# Patient Record
Sex: MALE | Race: WHITE | NOT HISPANIC OR LATINO | Employment: STUDENT | URBAN - METROPOLITAN AREA
[De-identification: names, ages, dates, MRNs, and addresses within clinical notes are randomized per-mention and may not be internally consistent; named-entity substitution may affect disease eponyms.]

---
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
## 2020-03-01 ENCOUNTER — HEALTH MAINTENANCE LETTER (OUTPATIENT)
Age: 20
End: 2020-03-01

## 2020-12-14 ENCOUNTER — HEALTH MAINTENANCE LETTER (OUTPATIENT)
Age: 20
End: 2020-12-14

## 2021-03-04 ENCOUNTER — TRANSFERRED RECORDS (OUTPATIENT)
Dept: HEALTH INFORMATION MANAGEMENT | Facility: CLINIC | Age: 21
End: 2021-03-04

## 2021-03-04 ENCOUNTER — OFFICE VISIT (OUTPATIENT)
Dept: ORTHOPEDICS | Facility: CLINIC | Age: 21
End: 2021-03-04
Payer: COMMERCIAL

## 2021-03-04 ENCOUNTER — ANCILLARY PROCEDURE (OUTPATIENT)
Dept: GENERAL RADIOLOGY | Facility: CLINIC | Age: 21
End: 2021-03-04
Attending: PREVENTIVE MEDICINE
Payer: COMMERCIAL

## 2021-03-04 DIAGNOSIS — S53.104S: ICD-10-CM

## 2021-03-04 DIAGNOSIS — S53.104S: Primary | ICD-10-CM

## 2021-03-04 PROCEDURE — 99213 OFFICE O/P EST LOW 20 MIN: CPT | Performed by: PREVENTIVE MEDICINE

## 2021-03-04 PROCEDURE — 73080 X-RAY EXAM OF ELBOW: CPT | Mod: RT | Performed by: RADIOLOGY

## 2021-03-04 ASSESSMENT — PAIN SCALES - GENERAL: PAINLEVEL: MILD PAIN (2)

## 2021-03-04 NOTE — LETTER
3/4/2021         RE: Mckinley Graham  [address] [facility] [address]        Dear Colleague,    Thank you for referring your patient, Mckinley Graham, to the [facility] [place]. Please see a copy of my visit note below.    HISTORY OF PRESENT ILLNESS  Mr. Graham is a pleasant 20 year old year old male who presents to clinic today with right elbow pain since 9/2018 injury/Dislocation  Has had treatments on his elbow at [facility]  Had reduction of elbow under anesthesia  Mckinley explains that since his elbow surgery after fracture/dislocation in 2018 he has continued to have some limitations in his motion and discomfort when he tries to lift weights and do activities at the gym  'he states that he doesn't feel 100% better' yet  He has no reports of numbness or tingling at this time in his arm or hand  Location: right elbow  Quality:  achy pain    Severity:5/10 at worst    Duration: since his injury in 2018 which occurred when he fell through a window from a height that injured his elbow and caused a fracture and dislocation that was reduced and repaired his extensor tendon, he also sustained a neck injury and a concussion  Timing: occurs intermittently in elbow  Context: occurs while exercising and lifting  Modifying factors:  resting and non-use makes it better, movement and use makes it worse  Associated signs & symptoms: occasional swelling    MEDICAL HISTORY  Patient Active Problem List   Diagnosis   (none) - all problems resolved or deleted       Current Outpatient Medications   Medication Sig Dispense Refill     Methylphenidate HCl (CONCERTA PO) Take 72 mg by mouth daily       diclofenac (VOLTAREN) 75 MG EC tablet Take 1 tablet (75 mg) by mouth 2 times daily as needed (Patient not taking: Reported on 3/4/2021) 40 tablet 1     tiZANidine (ZANAFLEX) 4 MG tablet Take 1-2 tablets (4-8 mg) by mouth nightly as needed (Patient not taking: Reported on 3/4/2021) 30  tablet 1       Allergies   Allergen Reactions     Cats      Pollen Extract        Family History   Problem Relation Age of Onset     Diabetes Other      Arthritis Other      Cancer Other      Psychotic Disorder Other      Social History     Socioeconomic History     Marital status: Single     Spouse name: Not on file     Number of children: Not on file     Years of education: Not on file     Highest education level: Not on file   Occupational History     Not on file   Social Needs     Financial resource strain: Not on file     Food insecurity     Worry: Not on file     Inability: Not on file     Transportation needs     Medical: Not on file     Non-medical: Not on file   Tobacco Use     Smoking status: Never Smoker     Smokeless tobacco: Never Used   Substance and Sexual Activity     Alcohol use: No     Drug use: No     Sexual activity: Never   Lifestyle     Physical activity     Days per week: Not on file     Minutes per session: Not on file     Stress: Not on file   Relationships     Social connections     Talks on phone: Not on file     Gets together: Not on file     Attends Zoroastrianism service: Not on file     Active member of club or organization: Not on file     Attends meetings of clubs or organizations: Not on file     Relationship status: Not on file     Intimate partner violence     Fear of current or ex partner: Not on file     Emotionally abused: Not on file     Physically abused: Not on file     Forced sexual activity: Not on file   Other Topics Concern     Not on file   Social History Narrative     Not on file       Additional medical/Social/Surgical histories reviewed in [facility] and updated as appropriate.     REVIEW OF SYSTEMS (3/4/2021)  10 point ROS of systems including Constitutional, Eyes, Respiratory, Cardiovascular, Gastroenterology, Genitourinary, Integumentary, Musculoskeletal, Psychiatric, Allergic/Immunologic were all negative except for pertinent positives noted in my HPI.     PHYSICAL  EXAM  VSS  General  - normal appearance, in no obvious distress  HEENT  - conjunctivae not injected, moist mucous membranes, normocephalic/atraumatic head, ears normal appearance, no lesions, mouth normal appearance, no scars, normal dentition and teeth present  CV  - normal radial pulse  Pulm  - normal respiratory pattern, non-labored  Musculoskeletal - right elbow  - inspection: normal joint alignment, no obvious deformity, mild soft tissue swelling laterally  - palpation: tender at the origin of the common extensor tendon  - ROM:  150 deg flexion with some discomfort- some limitation   Lacks 5 deg extension, with some discomfort   90 deg pronation    90 deg supination  - strength: 5/5 wrist extension with elbow flexed, 4/5 with elbow extended, painful resisted extension of long finger with elbow flexed, worse with extension, 5/5  strength  - special tests:  (-) varus  (-) valgus  (-) Tinel's  Neuro  - no sensory or motor deficit, grossly normal coordination, normal muscle tone  Skin  - no ecchymosis, erythema, warmth, or induration, no obvious rash  Psych  - interactive, appropriate, normal mood and affect  Neck: has negative spurlings today, full ROM of neck , with some discomfort with full extension  ASSESSMENT & PLAN  19 yo male with history of neck injury, strain, stable, and right elbow pain s/p elbow dislocation with reduction and surgical repair of tendon, not resolved  We reviewed his notes from previous treatments and due to the ongoing pain and discomfort, I recommended an MRI of the elbow for further evaluation at this time  Reviewed his elbow xray which showed: [place] along the radial head, medial humeral  epicondyles, new since comparison, may be due to posttraumatic change.  Otherwise, no substantial degenerative change.  He will followup with me after MRI  Recommended voltaren gel PRN and lidocaine patches PRN  And continue HEP for strengthening and flexibility    Appropriate PPE was utilized  for prevention of spread of Covid-19.  Grant Hdz MD, CAQSM        Again, thank you for allowing me to participate in the care of your patient.        Sincerely,        Grant Hdz MD

## 2021-03-04 NOTE — PATIENT INSTRUCTIONS
Thanks for coming today.  Ortho/Sports Medicine Clinic  [address] [place] [address]    To schedule future appointments in Ortho Clinic, you may call [phone].    To schedule ordered imaging by your provider:   Call Central Imaging [phone]    To schedule an injection ordered by your provider:  Call Central Imaging Injection scheduling line: [phone]  [email or web address] available online at:  [email or web address]    Please call if any further questions or concerns ([phone]).  Clinic hours 8 am to 5 pm.    Return to clinic (call) if symptoms worsen or fail to improve.

## 2021-03-04 NOTE — PROGRESS NOTES
HISTORY OF PRESENT ILLNESS  Mr. Graham is a pleasant 20 year old year old male who presents to clinic today with right elbow pain since 9/2018 injury/Dislocation  Has had treatments on his elbow at [facility]  Had reduction of elbow under anesthesia  Mckinley explains that since his elbow surgery after fracture/dislocation in 2018 he has continued to have some limitations in his motion and discomfort when he tries to lift weights and do activities at the gym  'he states that he doesn't feel 100% better' yet  He has no reports of numbness or tingling at this time in his arm or hand  Location: right elbow  Quality:  achy pain    Severity:5/10 at worst    Duration: since his injury in 2018 which occurred when he fell through a window from a height that injured his elbow and caused a fracture and dislocation that was reduced and repaired his extensor tendon, he also sustained a neck injury and a concussion  Timing: occurs intermittently in elbow  Context: occurs while exercising and lifting  Modifying factors:  resting and non-use makes it better, movement and use makes it worse  Associated signs & symptoms: occasional swelling    MEDICAL HISTORY  Patient Active Problem List   Diagnosis   (none) - all problems resolved or deleted       Current Outpatient Medications   Medication Sig Dispense Refill     Methylphenidate HCl (CONCERTA PO) Take 72 mg by mouth daily       diclofenac (VOLTAREN) 75 MG EC tablet Take 1 tablet (75 mg) by mouth 2 times daily as needed (Patient not taking: Reported on 3/4/2021) 40 tablet 1     tiZANidine (ZANAFLEX) 4 MG tablet Take 1-2 tablets (4-8 mg) by mouth nightly as needed (Patient not taking: Reported on 3/4/2021) 30 tablet 1       Allergies   Allergen Reactions     Cats      Pollen Extract        Family History   Problem Relation Age of Onset     Diabetes Other      Arthritis Other      Cancer Other      Psychotic Disorder Other      Social History     Socioeconomic History     Marital  status: Single     Spouse name: Not on file     Number of children: Not on file     Years of education: Not on file     Highest education level: Not on file   Occupational History     Not on file   Social Needs     Financial resource strain: Not on file     Food insecurity     Worry: Not on file     Inability: Not on file     Transportation needs     Medical: Not on file     Non-medical: Not on file   Tobacco Use     Smoking status: Never Smoker     Smokeless tobacco: Never Used   Substance and Sexual Activity     Alcohol use: No     Drug use: No     Sexual activity: Never   Lifestyle     Physical activity     Days per week: Not on file     Minutes per session: Not on file     Stress: Not on file   Relationships     Social connections     Talks on phone: Not on file     Gets together: Not on file     Attends Orthodox service: Not on file     Active member of club or organization: Not on file     Attends meetings of clubs or organizations: Not on file     Relationship status: Not on file     Intimate partner violence     Fear of current or ex partner: Not on file     Emotionally abused: Not on file     Physically abused: Not on file     Forced sexual activity: Not on file   Other Topics Concern     Not on file   Social History Narrative     Not on file       Additional medical/Social/Surgical histories reviewed in [facility] and updated as appropriate.     REVIEW OF SYSTEMS (3/4/2021)  10 point ROS of systems including Constitutional, Eyes, Respiratory, Cardiovascular, Gastroenterology, Genitourinary, Integumentary, Musculoskeletal, Psychiatric, Allergic/Immunologic were all negative except for pertinent positives noted in my HPI.     PHYSICAL EXAM  VSS  General  - normal appearance, in no obvious distress  HEENT  - conjunctivae not injected, moist mucous membranes, normocephalic/atraumatic head, ears normal appearance, no lesions, mouth normal appearance, no scars, normal dentition and teeth present  CV  - normal radial  pulse  Pulm  - normal respiratory pattern, non-labored  Musculoskeletal - right elbow  - inspection: normal joint alignment, no obvious deformity, mild soft tissue swelling laterally  - palpation: tender at the origin of the common extensor tendon  - ROM:  150 deg flexion with some discomfort- some limitation   Lacks 5 deg extension, with some discomfort   90 deg pronation    90 deg supination  - strength: 5/5 wrist extension with elbow flexed, 4/5 with elbow extended, painful resisted extension of long finger with elbow flexed, worse with extension, 5/5  strength  - special tests:  (-) varus  (-) valgus  (-) Tinel's  Neuro  - no sensory or motor deficit, grossly normal coordination, normal muscle tone  Skin  - no ecchymosis, erythema, warmth, or induration, no obvious rash  Psych  - interactive, appropriate, normal mood and affect  Neck: has negative spurlings today, full ROM of neck , with some discomfort with full extension  ASSESSMENT & PLAN  21 yo male with history of neck injury, strain, stable, and right elbow pain s/p elbow dislocation with reduction and surgical repair of tendon, not resolved  We reviewed his notes from previous treatments and due to the ongoing pain and discomfort, I recommended an MRI of the elbow for further evaluation at this time  Reviewed his elbow xray which showed: [place] along the radial head, medial humeral  epicondyles, new since comparison, may be due to posttraumatic change.  Otherwise, no substantial degenerative change.  He will followup with me after MRI  Recommended voltaren gel PRN and lidocaine patches PRN  And continue HEP for strengthening and flexibility    Appropriate PPE was utilized for prevention of spread of Covid-19.  Grant Hdz MD, [facility]

## 2021-04-09 ENCOUNTER — TRANSFERRED RECORDS (OUTPATIENT)
Dept: HEALTH INFORMATION MANAGEMENT | Facility: CLINIC | Age: 21
End: 2021-04-09

## 2021-04-17 ENCOUNTER — HEALTH MAINTENANCE LETTER (OUTPATIENT)
Age: 21
End: 2021-04-17

## 2021-05-06 ENCOUNTER — VIRTUAL VISIT (OUTPATIENT)
Dept: ORTHOPEDICS | Facility: CLINIC | Age: 21
End: 2021-05-06
Payer: COMMERCIAL

## 2021-05-06 DIAGNOSIS — S53.104S: Primary | ICD-10-CM

## 2021-05-06 PROCEDURE — 99213 OFFICE O/P EST LOW 20 MIN: CPT | Mod: 95 | Performed by: PREVENTIVE MEDICINE

## 2021-05-06 NOTE — LETTER
5/6/2021      RE: Mckinley Graham  [address] [facility] [address]       Patient is a  20   year old who is being evaluated via a billable telephone visit.      What phone number would you like to be contacted at? CELL  How would you like to obtain your AVS? NATI        Subjective   Patient is a   20  year old who presents by phone call visit for the following:   To discuss his chronic elbow pain  With weight lifting  And after having elbow MRI  And followup with surgeon who performed surgery after his elbow dislocation(see previous note for details)  HPI       Review of Systems   Constitutional, HEENT, cardiovascular, pulmonary, gi and gu systems are negative, except as otherwise noted.      Objective           Vitals:  No vitals were obtained today due to virtual visit.    Physical Exam   healthy, alert and no distress  PSYCH: Alert and oriented times 3; coherent speech, normal   rate and volume, able to articulate logical thoughts, able   to abstract reason, no tangential thoughts, no hallucinations   or delusions  His affect is normal  RESP: No cough, no audible wheezing, able to talk in full sentences  Remainder of exam unable to be completed due to telephone visits    Assessment/Plan  21 yo male with right elbow previous dislocation with surgical correction, with current chronic pain in tendinopathy and ligamentous scarring causing some pain with activities    I independently reviewed the following imaging studies and discussed with patient:  Elbow MRI: shows some scarring in elbow joint, scar tissue on lateral collateral ligament and random tendinopathy  Discussed at length with them about options regarding more PT  And considering PRP for joint/ligament scarring and tendinopathies seen in MRI  Will look forward to following up if they decide to            Phone call duration: 20 minutes  Phone call start: 3:00pm  Phone call end: 3:20pm  Dr Wilder Hdz MD

## 2021-05-06 NOTE — LETTER
Date:May 25, 2021      Patient was self referred, no letter generated. Do not send.        [facility] Health Information

## 2021-05-06 NOTE — LETTER
Date:May 25, 2021      Patient was self referred, no letter generated. Do not send.        [facility] Health Information

## 2021-05-06 NOTE — LETTER
5/6/2021       RE: Mckinley Graham  [address] [facility] [address]     Dear Colleague,    Thank you for referring your patient, Mckinley Graham, to the [facility] [place] [facility]. Please see a copy of my visit note below.    Patient is a  20   year old who is being evaluated via a billable telephone visit.      What phone number would you like to be contacted at? CELL  How would you like to obtain your AVS? MYCHART        Subjective   Patient is a   20  year old who presents by phone call visit for the following:   To discuss his chronic elbow pain  With weight lifting  And after having elbow MRI  And followup with surgeon who performed surgery after his elbow dislocation(see previous note for details)  HPI       Review of Systems   Constitutional, HEENT, cardiovascular, pulmonary, gi and gu systems are negative, except as otherwise noted.      Objective           Vitals:  No vitals were obtained today due to virtual visit.    Physical Exam   healthy, alert and no distress  PSYCH: Alert and oriented times 3; coherent speech, normal   rate and volume, able to articulate logical thoughts, able   to abstract reason, no tangential thoughts, no hallucinations   or delusions  His affect is normal  RESP: No cough, no audible wheezing, able to talk in full sentences  Remainder of exam unable to be completed due to telephone visits    Assessment/Plan  19 yo male with right elbow previous dislocation with surgical correction, with current chronic pain in tendinopathy and ligamentous scarring causing some pain with activities    I independently reviewed the following imaging studies and discussed with patient:  Elbow MRI: shows some scarring in elbow joint, scar tissue on lateral collateral ligament and random tendinopathy  Discussed at length with them about options regarding more PT  And considering PRP for joint/ligament  scarring and tendinopathies seen in MRI  Will look forward to following up if they decide to            Phone call duration: 20 minutes  Phone call start: 3:00pm  Phone call end: 3:20pm  Dr Hdz      Again, thank you for allowing me to participate in the care of your patient.      Sincerely,    Grant Hdz MD

## 2021-05-17 NOTE — PROGRESS NOTES
Patient is a  20   year old who is being evaluated via a billable telephone visit.      What phone number would you like to be contacted at? CELL  How would you like to obtain your AVS? NATI        Subjective   Patient is a   20  year old who presents by phone call visit for the following:   To discuss his chronic elbow pain  With weight lifting  And after having elbow MRI  And followup with surgeon who performed surgery after his elbow dislocation(see previous note for details)  HPI       Review of Systems   Constitutional, HEENT, cardiovascular, pulmonary, gi and gu systems are negative, except as otherwise noted.      Objective           Vitals:  No vitals were obtained today due to virtual visit.    Physical Exam   healthy, alert and no distress  PSYCH: Alert and oriented times 3; coherent speech, normal   rate and volume, able to articulate logical thoughts, able   to abstract reason, no tangential thoughts, no hallucinations   or delusions  His affect is normal  RESP: No cough, no audible wheezing, able to talk in full sentences  Remainder of exam unable to be completed due to telephone visits    Assessment/Plan  19 yo male with right elbow previous dislocation with surgical correction, with current chronic pain in tendinopathy and ligamentous scarring causing some pain with activities    I independently reviewed the following imaging studies and discussed with patient:  Elbow MRI: shows some scarring in elbow joint, scar tissue on lateral collateral ligament and random tendinopathy  Discussed at length with them about options regarding more PT  And considering PRP for joint/ligament scarring and tendinopathies seen in MRI  Will look forward to following up if they decide to            Phone call duration: 20 minutes  Phone call start: 3:00pm  Phone call end: 3:20pm  Dr Hdz

## 2021-10-02 ENCOUNTER — HEALTH MAINTENANCE LETTER (OUTPATIENT)
Age: 21
End: 2021-10-02

## 2022-05-08 ENCOUNTER — HEALTH MAINTENANCE LETTER (OUTPATIENT)
Age: 22
End: 2022-05-08

## 2023-01-14 ENCOUNTER — HEALTH MAINTENANCE LETTER (OUTPATIENT)
Age: 23
End: 2023-01-14

## 2023-06-02 ENCOUNTER — HEALTH MAINTENANCE LETTER (OUTPATIENT)
Age: 23
End: 2023-06-02

## 2023-08-01 NOTE — TELEPHONE ENCOUNTER
DIAGNOSIS: (L) Knee injury and pain   APPOINTMENT DATE: 08/10/2023   NOTES STATUS DETAILS   OFFICE NOTE from referring provider N/A    OFFICE NOTE from other specialist Internal 01/22/2018 Dr López [facility]    DISCHARGE SUMMARY from hospital N/A    DISCHARGE REPORT from the ER N/A    OPERATIVE REPORT N/A    EMG report N/A    MEDICATION LIST N/A    MRI N/A    DEXA (osteoporosis/bone health) N/A    CT SCAN N/A    XRAYS (IMAGES & REPORTS) N/A

## 2023-08-08 DIAGNOSIS — M25.562 LEFT KNEE PAIN: Primary | ICD-10-CM

## 2023-08-10 ENCOUNTER — PRE VISIT (OUTPATIENT)
Dept: ORTHOPEDICS | Facility: CLINIC | Age: 23
End: 2023-08-10